# Patient Record
Sex: FEMALE | Race: WHITE
[De-identification: names, ages, dates, MRNs, and addresses within clinical notes are randomized per-mention and may not be internally consistent; named-entity substitution may affect disease eponyms.]

---

## 2018-04-06 ENCOUNTER — HOSPITAL ENCOUNTER (OUTPATIENT)
Dept: HOSPITAL 53 - M ADMPAT | Age: 73
End: 2018-04-06
Attending: ORTHOPAEDIC SURGERY
Payer: MEDICARE

## 2018-04-06 DIAGNOSIS — Z79.899: ICD-10-CM

## 2018-04-06 DIAGNOSIS — Z79.01: ICD-10-CM

## 2018-04-06 DIAGNOSIS — Z01.818: Primary | ICD-10-CM

## 2018-04-06 DIAGNOSIS — M17.12: ICD-10-CM

## 2018-04-06 LAB
ALBUMIN/GLOBULIN RATIO: 1.17 (ref 1–1.93)
ALBUMIN: 4.1 GM/DL (ref 3.2–5.2)
ALKALINE PHOSPHATASE: 79 U/L (ref 45–117)
ALT SERPL W P-5'-P-CCNC: 26 U/L (ref 12–78)
ANION GAP: 3 MEQ/L (ref 8–16)
APPEARANCE, URINE: CLEAR
AST SERPL-CCNC: 20 U/L (ref 7–37)
BACTERIA UR QL AUTO: NEGATIVE
BILIRUBIN, URINE AUTO: NEGATIVE
BILIRUBIN,TOTAL: 0.4 MG/DL (ref 0.2–1)
BLOOD UREA NITROGEN: 12 MG/DL (ref 7–18)
BLOOD, URINE BLOOD: NEGATIVE
CALCIUM LEVEL: 9.2 MG/DL (ref 8.8–10.2)
CARBON DIOXIDE LEVEL: 33 MEQ/L (ref 21–32)
CHLORIDE LEVEL: 108 MEQ/L (ref 98–107)
CREATININE FOR GFR: 0.87 MG/DL (ref 0.55–1.3)
ERYTHROCYTE SEDIMENTATION RATE: 10 MM/HR (ref 0–30)
GFR SERPL CREATININE-BSD FRML MDRD: > 60 ML/MIN/{1.73_M2} (ref 39–?)
GLUCOSE, FASTING: 90 MG/DL (ref 70–100)
GLUCOSE, URINE (UA) AUTO: NEGATIVE MG/DL
HEMATOCRIT: 42.1 % (ref 36–47)
HEMOGLOBIN: 14 G/DL (ref 12–15.5)
INR: 0.89
KETONE, URINE AUTO: NEGATIVE MG/DL
LEUKOCYTE ESTERASE UR QL STRIP.AUTO: (no result)
MEAN CORPUSCULAR HEMOGLOBIN: 29.8 PG (ref 27–33)
MEAN CORPUSCULAR HGB CONC: 33.3 G/DL (ref 32–36.5)
MEAN CORPUSCULAR VOLUME: 89.6 FL (ref 80–96)
MUCUS, URINE: (no result)
NITRITE, URINE AUTO: NEGATIVE
NRBC BLD AUTO-RTO: 0 % (ref 0–0)
PH,URINE: 7 UNITS (ref 5–9)
PLATELET COUNT, AUTOMATED: 200 10^3/UL (ref 150–450)
POTASSIUM SERUM: 4.3 MEQ/L (ref 3.5–5.1)
PROT UR QL STRIP.AUTO: NEGATIVE MG/DL
PROTHROMBIN TIME: 12.1 SECONDS (ref 12.4–14.5)
RBC, URINE AUTO: 3 /HPF (ref 0–3)
RED BLOOD COUNT: 4.7 10^6/UL (ref 4–5.4)
RED CELL DISTRIBUTION WIDTH: 14 % (ref 11.5–14.5)
SODIUM LEVEL: 144 MEQ/L (ref 136–145)
SPECIFIC GRAVITY URINE AUTO: 1 (ref 1–1.03)
SQUAMOUS #/AREA URNS AUTO: 0 /HPF (ref 0–6)
TOTAL PROTEIN: 7.6 GM/DL (ref 6.4–8.2)
UROBILINOGEN, URINE AUTO: 0.2 MG/DL (ref 0–2)
WBC, URINE AUTO: 9 /HPF (ref 0–3)
WHITE BLOOD COUNT: 5.4 10^3/UL (ref 4–10)

## 2018-04-06 PROCEDURE — 71046 X-RAY EXAM CHEST 2 VIEWS: CPT

## 2018-04-27 ENCOUNTER — HOSPITAL ENCOUNTER (INPATIENT)
Dept: HOSPITAL 53 - M OR | Age: 73
LOS: 3 days | Discharge: HOME HEALTH | DRG: 470 | End: 2018-04-30
Attending: ORTHOPAEDIC SURGERY | Admitting: ORTHOPAEDIC SURGERY
Payer: MEDICARE

## 2018-04-27 DIAGNOSIS — Z86.73: ICD-10-CM

## 2018-04-27 DIAGNOSIS — Z88.6: ICD-10-CM

## 2018-04-27 DIAGNOSIS — E78.5: ICD-10-CM

## 2018-04-27 DIAGNOSIS — M17.12: Primary | ICD-10-CM

## 2018-04-27 DIAGNOSIS — J32.9: ICD-10-CM

## 2018-04-27 DIAGNOSIS — Z79.899: ICD-10-CM

## 2018-04-27 DIAGNOSIS — K21.9: ICD-10-CM

## 2018-04-27 DIAGNOSIS — J31.0: ICD-10-CM

## 2018-04-27 DIAGNOSIS — E03.9: ICD-10-CM

## 2018-04-27 DIAGNOSIS — I48.0: ICD-10-CM

## 2018-04-27 DIAGNOSIS — E55.9: ICD-10-CM

## 2018-04-27 DIAGNOSIS — Z79.82: ICD-10-CM

## 2018-04-27 PROCEDURE — 0SRD0J9 REPLACEMENT OF LEFT KNEE JOINT WITH SYNTHETIC SUBSTITUTE, CEMENTED, OPEN APPROACH: ICD-10-PCS

## 2018-04-27 RX ADMIN — Medication 1 CAP: at 14:33

## 2018-04-27 RX ADMIN — ROSUVASTATIN CALCIUM 1 MG: 10 TABLET, FILM COATED ORAL at 14:35

## 2018-04-27 RX ADMIN — Medication 1 UNITS: at 14:33

## 2018-04-27 RX ADMIN — DEXTROSE MONOHYDRATE 1 MG: 50 INJECTION, SOLUTION INTRAVENOUS at 08:52

## 2018-04-27 RX ADMIN — ROSUVASTATIN CALCIUM 1 MG: 10 TABLET, FILM COATED ORAL at 14:33

## 2018-04-27 RX ADMIN — CEFAZOLIN SODIUM 1 GM: 1 INJECTION, POWDER, FOR SOLUTION INTRAMUSCULAR; INTRAVENOUS at 08:53

## 2018-04-27 RX ADMIN — FEXOFENADINE HYDROCHLORIDE 1 MG: 60 TABLET ORAL at 14:33

## 2018-04-27 RX ADMIN — MIDAZOLAM 1 MG: 1 INJECTION INTRAMUSCULAR; INTRAVENOUS at 07:15

## 2018-04-27 RX ADMIN — WARFARIN SODIUM 1 MG: 5 TABLET ORAL at 16:13

## 2018-04-27 RX ADMIN — SODIUM CHLORIDE 1 MLS/HR: 9 INJECTION, SOLUTION INTRAVENOUS at 23:45

## 2018-04-27 RX ADMIN — SODIUM CHLORIDE, POTASSIUM CHLORIDE, SODIUM LACTATE AND CALCIUM CHLORIDE 1 MLS/HR: 600; 310; 30; 20 INJECTION, SOLUTION INTRAVENOUS at 11:23

## 2018-04-27 RX ADMIN — SODIUM CHLORIDE, POTASSIUM CHLORIDE, SODIUM LACTATE AND CALCIUM CHLORIDE 1 MLS/HR: 600; 310; 30; 20 INJECTION, SOLUTION INTRAVENOUS at 14:33

## 2018-04-27 RX ADMIN — SODIUM CHLORIDE, POTASSIUM CHLORIDE, SODIUM LACTATE AND CALCIUM CHLORIDE 1 MLS/HR: 600; 310; 30; 20 INJECTION, SOLUTION INTRAVENOUS at 06:17

## 2018-04-27 RX ADMIN — FENTANYL CITRATE 1 MCG: 50 INJECTION, SOLUTION INTRAMUSCULAR; INTRAVENOUS at 07:15

## 2018-04-27 RX ADMIN — DIGOXIN 1 MG: 0.25 INJECTION INTRAMUSCULAR; INTRAVENOUS at 23:12

## 2018-04-27 RX ADMIN — LEVOTHYROXINE SODIUM 1 MCG: 75 TABLET ORAL at 14:33

## 2018-04-27 RX ADMIN — ACETAMINOPHEN 1 MG: 325 TABLET ORAL at 22:13

## 2018-04-27 RX ADMIN — ONDANSETRON 1 MG: 2 INJECTION INTRAMUSCULAR; INTRAVENOUS at 20:43

## 2018-04-27 RX ADMIN — MAGNESIUM HYDROXIDE 1 ML: 400 SUSPENSION ORAL at 17:58

## 2018-04-27 RX ADMIN — ASPIRIN 1 MG: 81 TABLET ORAL at 14:33

## 2018-04-27 RX ADMIN — FLUTICASONE PROPIONATE 1 SPRAY: 50 SPRAY, METERED NASAL at 14:33

## 2018-04-27 RX ADMIN — BUPIVACAINE 1 ML: 13.3 INJECTION, SUSPENSION, LIPOSOMAL INFILTRATION at 08:53

## 2018-04-28 LAB
ANION GAP: 4 MEQ/L (ref 8–16)
BLOOD UREA NITROGEN: 16 MG/DL (ref 7–18)
CALCIUM LEVEL: 7.9 MG/DL (ref 8.8–10.2)
CARBON DIOXIDE LEVEL: 30 MEQ/L (ref 21–32)
CHLORIDE LEVEL: 109 MEQ/L (ref 98–107)
CREATININE FOR GFR: 0.99 MG/DL (ref 0.55–1.3)
FT4I SERPL CALC-MCNC: 4.3 % (ref 1.3–4.8)
GFR SERPL CREATININE-BSD FRML MDRD: 58.9 ML/MIN/{1.73_M2} (ref 39–?)
GLUCOSE, FASTING: 102 MG/DL (ref 70–100)
HEMATOCRIT: 33.8 % (ref 36–47)
HEMOGLOBIN: 11.1 G/DL (ref 12–15.5)
INR: 1.03
MAGNESIUM LEVEL: 2.5 MG/DL (ref 1.8–2.4)
MEAN CORPUSCULAR HEMOGLOBIN: 29.8 PG (ref 27–33)
MEAN CORPUSCULAR HGB CONC: 32.8 G/DL (ref 32–36.5)
MEAN CORPUSCULAR VOLUME: 90.6 FL (ref 80–96)
NRBC BLD AUTO-RTO: 0 % (ref 0–0)
PLATELET COUNT, AUTOMATED: 148 10^3/UL (ref 150–450)
POTASSIUM SERUM: 3.9 MEQ/L (ref 3.5–5.1)
PROTHROMBIN TIME: 13.6 SECONDS (ref 12.4–14.5)
RED BLOOD COUNT: 3.73 10^6/UL (ref 4–5.4)
RED CELL DISTRIBUTION WIDTH: 14.4 % (ref 11.5–14.5)
SODIUM LEVEL: 143 MEQ/L (ref 136–145)
T UPTAKE: 38 % (ref 30–39)
THYROID STIMULATING HORMONE: 0.99 UIU/ML (ref 0.36–3.74)
THYROXINE (T4): 11.2 UG/DL (ref 4.5–12)
WHITE BLOOD COUNT: 12.1 10^3/UL (ref 4–10)

## 2018-04-28 RX ADMIN — FLUTICASONE PROPIONATE 1 SPRAY: 50 SPRAY, METERED NASAL at 08:28

## 2018-04-28 RX ADMIN — SODIUM CHLORIDE, POTASSIUM CHLORIDE, SODIUM LACTATE AND CALCIUM CHLORIDE 1 MLS/HR: 600; 310; 30; 20 INJECTION, SOLUTION INTRAVENOUS at 00:29

## 2018-04-28 RX ADMIN — DIGOXIN 1 MG: 0.25 INJECTION INTRAMUSCULAR; INTRAVENOUS at 00:28

## 2018-04-28 RX ADMIN — ACETAMINOPHEN 1 MG: 325 TABLET ORAL at 08:30

## 2018-04-28 RX ADMIN — SODIUM CHLORIDE 1 MLS/HR: 9 INJECTION, SOLUTION INTRAVENOUS at 00:30

## 2018-04-28 RX ADMIN — METOPROLOL TARTRATE 1 MG: 25 TABLET, FILM COATED ORAL at 11:19

## 2018-04-28 RX ADMIN — LEVOTHYROXINE SODIUM 1 MCG: 75 TABLET ORAL at 06:25

## 2018-04-28 RX ADMIN — HYDROCODONE BITARTRATE AND ACETAMINOPHEN 1 TAB: 5; 325 TABLET ORAL at 01:06

## 2018-04-28 RX ADMIN — MORPHINE SULFATE 1 MG: 4 INJECTION INTRAVENOUS at 16:29

## 2018-04-28 RX ADMIN — WARFARIN SODIUM 1 MG: 5 TABLET ORAL at 16:29

## 2018-04-28 RX ADMIN — HYDROCODONE BITARTRATE AND ACETAMINOPHEN 1 TAB: 5; 325 TABLET ORAL at 11:46

## 2018-04-28 RX ADMIN — Medication 1 CAP: at 08:28

## 2018-04-28 RX ADMIN — Medication 1 UNITS: at 08:28

## 2018-04-28 RX ADMIN — METOPROLOL TARTRATE 1 MG: 25 TABLET, FILM COATED ORAL at 20:48

## 2018-04-28 RX ADMIN — HYDROCODONE BITARTRATE AND ACETAMINOPHEN 1 TAB: 5; 325 TABLET ORAL at 23:51

## 2018-04-28 RX ADMIN — FEXOFENADINE HYDROCHLORIDE 1 MG: 60 TABLET ORAL at 08:29

## 2018-04-28 RX ADMIN — HYDROCODONE BITARTRATE AND ACETAMINOPHEN 1 TAB: 5; 325 TABLET ORAL at 17:45

## 2018-04-28 RX ADMIN — ASPIRIN 1 MG: 81 TABLET ORAL at 08:29

## 2018-04-28 RX ADMIN — MORPHINE SULFATE 1 MG: 4 INJECTION INTRAVENOUS at 14:46

## 2018-04-29 LAB
HEMATOCRIT: 34.2 % (ref 36–47)
HEMOGLOBIN: 11.4 G/DL (ref 12–15.5)
INR: 1.44
MEAN CORPUSCULAR HEMOGLOBIN: 30.3 PG (ref 27–33)
MEAN CORPUSCULAR HGB CONC: 33.3 G/DL (ref 32–36.5)
MEAN CORPUSCULAR VOLUME: 91 FL (ref 80–96)
NRBC BLD AUTO-RTO: 0 % (ref 0–0)
PLATELET COUNT, AUTOMATED: 145 10^3/UL (ref 150–450)
PROTHROMBIN TIME: 17.9 SECONDS (ref 12.4–14.5)
RED BLOOD COUNT: 3.76 10^6/UL (ref 4–5.4)
RED CELL DISTRIBUTION WIDTH: 14.6 % (ref 11.5–14.5)
WHITE BLOOD COUNT: 10.1 10^3/UL (ref 4–10)

## 2018-04-29 RX ADMIN — METOPROLOL TARTRATE 1 MG: 25 TABLET, FILM COATED ORAL at 21:53

## 2018-04-29 RX ADMIN — Medication 1 UNITS: at 08:59

## 2018-04-29 RX ADMIN — LEVOTHYROXINE SODIUM 1 MCG: 75 TABLET ORAL at 06:03

## 2018-04-29 RX ADMIN — FLUTICASONE PROPIONATE 1 SPRAY: 50 SPRAY, METERED NASAL at 09:00

## 2018-04-29 RX ADMIN — FEXOFENADINE HYDROCHLORIDE 1 MG: 60 TABLET ORAL at 21:52

## 2018-04-29 RX ADMIN — Medication 1 CAP: at 08:58

## 2018-04-29 RX ADMIN — HYDROCODONE BITARTRATE AND ACETAMINOPHEN 1 TAB: 5; 325 TABLET ORAL at 18:29

## 2018-04-29 RX ADMIN — ROSUVASTATIN CALCIUM 1 MG: 10 TABLET, FILM COATED ORAL at 08:59

## 2018-04-29 RX ADMIN — WARFARIN SODIUM 1 MG: 7.5 TABLET ORAL at 18:30

## 2018-04-29 RX ADMIN — ASPIRIN 1 MG: 81 TABLET ORAL at 08:58

## 2018-04-29 RX ADMIN — HYDROCODONE BITARTRATE AND ACETAMINOPHEN 1 TAB: 5; 325 TABLET ORAL at 12:38

## 2018-04-29 RX ADMIN — FEXOFENADINE HYDROCHLORIDE 1 MG: 60 TABLET ORAL at 08:58

## 2018-04-29 RX ADMIN — HYDROCODONE BITARTRATE AND ACETAMINOPHEN 1 TAB: 5; 325 TABLET ORAL at 06:03

## 2018-04-29 RX ADMIN — METOPROLOL TARTRATE 1 MG: 25 TABLET, FILM COATED ORAL at 08:59

## 2018-04-29 RX ADMIN — ROSUVASTATIN CALCIUM 1 MG: 10 TABLET, FILM COATED ORAL at 21:52

## 2018-04-30 LAB
INR: 1.52
PROTHROMBIN TIME: 18.7 SECONDS (ref 12.4–14.5)

## 2018-04-30 RX ADMIN — Medication 1 UNITS: at 08:36

## 2018-04-30 RX ADMIN — HYDROCODONE BITARTRATE AND ACETAMINOPHEN 1 TAB: 5; 325 TABLET ORAL at 01:55

## 2018-04-30 RX ADMIN — DOCUSATE SODIUM 1 MG: 100 CAPSULE, LIQUID FILLED ORAL at 08:43

## 2018-04-30 RX ADMIN — LEVOTHYROXINE SODIUM 1 MCG: 75 TABLET ORAL at 05:32

## 2018-04-30 RX ADMIN — Medication 1 CAP: at 08:36

## 2018-04-30 RX ADMIN — FLUTICASONE PROPIONATE 1 SPRAY: 50 SPRAY, METERED NASAL at 08:37

## 2018-04-30 RX ADMIN — METOPROLOL TARTRATE 1 MG: 25 TABLET, FILM COATED ORAL at 08:36

## 2018-04-30 RX ADMIN — HYDROCODONE BITARTRATE AND ACETAMINOPHEN 1 TAB: 5; 325 TABLET ORAL at 08:37

## 2020-06-12 ENCOUNTER — HOSPITAL ENCOUNTER (OUTPATIENT)
Dept: HOSPITAL 53 - M LABSMTC | Age: 75
End: 2020-06-12
Attending: ANESTHESIOLOGY
Payer: MEDICARE

## 2020-06-12 DIAGNOSIS — Z01.818: Primary | ICD-10-CM

## 2020-06-12 DIAGNOSIS — Z11.59: ICD-10-CM

## 2020-06-12 NOTE — HPE
DATE OF ANTICIPATED ADMISSION:  06/15/2020

 

ATTENDING PHYSICIAN:  Dr. Genaro Kellogg

 

CHIEF COMPLAINT:  Right knee pain and stiffness.

 

HISTORY:

The patient is a pleasant 74-year-old female with progressively worsening right

knee pain and stiffness.  She failed to improve with conservative measures.  She

continues to have symptoms with weightbearing activities and activities of daily

living.  She consented for an elective right total knee arthroplasty with Dr. Kellogg for her continued symptoms.  Medical optimization completed with MARILYNN Bernardo.

 

CURRENT MEDICATIONS:

- B complex vitamins

- Colace 100 mg daily

- Allegra 180 mg daily

- levothyroxine 75 mcg daily

- loratadine 10 mg daily

- daily multivitamin

- vitamin D 1000 units daily

- Xarelto 10 mg daily

 

ALLERGIES:

ANTIINFLAMMATORIES, results in gastrointestinal (GI) upset.

 

CHRONIC MEDICAL CONDITIONS:

Chronic recurrent sinusitis.

Gastroesophageal reflux disease.

History of transient ischemic attack (TIA).

Hyperlipidemia.

Hypothyroid.

Paroxysmal atrial fibrillation.

Chronic rhinitis.

 

PAST SURGICAL HISTORY:

Left total knee arthroplasty.

Cholecystectomy.

Bunionectomy.

Partial hysterectomy.

Bladder repair.

Hammertoe correction.

Tubal ligation.

 

SOCIAL HISTORY:

The patient does not use tobacco and rarely consumes alcohol.

 

REVIEW OF SYSTEMS:

The patient denies fevers, chills, nausea, vomiting, or diarrhea.  Denies chest

pain, shortness of breath, lightheadedness, dizziness, or headaches.  She denies

any abdominal pain or headaches.  She denies any recent upper respiratory or

urinary tract infection symptoms.  She continues to have right knee pain with

weightbearing activities and activities of daily living.

 

PHYSICAL EXAMINATION:

GENERAL:  Well-nourished, well-developed female in no apparent distress.  She is

alert, oriented and cooperative.  Mood and affect are appropriate.

VITAL SIGNS:  Blood pressure 128/64, heart rate 68, respirations 13.  Height 66

inches.  Weight 177 pounds.  Temperature 96.8.

HEART:  Regular rate and rhythm despite diagnosis of paroxysmal atrial

fibrillation.

LUNGS:  Clear to auscultation bilaterally.  Breathing is regular and nonlabored.

 

ABDOMEN:  Soft and nontender to palpation.  Bowel sounds are present.

MUSCULOSKELETAL:  The patient is walking with a slight limp favoring the right

lower extremity.  Right knee exhibits no gross abnormalities.  Skin is intact.

She has full extension of the knee and can flex to approximately 110 degrees.

Right lower extremity strength is 5/5.  No hip irritability elicited with range

of motion testing.  The patient's calf is soft, nontender to palpation with no

palpable cords noted.  She is neurovascularly intact distally.

 

LABORATORY DATA:

EKG normal sinus with rhythm with possible lateral infarct age indeterminate, no

change from previous studies.

 

Chest x-ray no acute cardiopulmonary process.

 

Right knee x-ray notable for end-stage degenerative changes.

 

Complete blood count - WBCs 5, RBCs 4.65, hemoglobin 14.1, hematocrit 42.2,

platelets 184.

 

Prothrombin time 10.6, INR 1.  ESR 12.

 

Comprehensive metabolic profile - BUN 17, sodium 141, potassium 4.0, chloride

108, carbon dioxide 20, anion gap 9, glucose 95, creatinine 0.9, ALT 20, AST 22,

alkaline phosphatase 71, calcium 9.4, bilirubin 0.4, albumin 4.2, protein 7.9,

GFR is greater than 60.

 

IMPRESSION:

Right knee osteoarthritis with x-rays notable for end-stage degenerative

changes.

 

PLAN:

The patient consented for an elective right total knee arthroplasty with Dr. Kellogg

for her continued symptoms.  Medical optimization completed with MARILYNN Bernardo.  The patient is using her Hibiclens wash and mupirocin nasal ointment.

She will follow her primary care manager's recommendations for stopping

anticoagulants and how to take their normal daily medications.  The patient will

be nothing by mouth after midnight with the exception of a small sip of water to

take her metoprolol per her primary care manager's recommendations.

## 2020-06-15 ENCOUNTER — HOSPITAL ENCOUNTER (INPATIENT)
Dept: HOSPITAL 53 - M OR | Age: 75
LOS: 1 days | Discharge: HOME HEALTH | DRG: 470 | End: 2020-06-16
Attending: ORTHOPAEDIC SURGERY | Admitting: ORTHOPAEDIC SURGERY
Payer: MEDICARE

## 2020-06-15 VITALS — DIASTOLIC BLOOD PRESSURE: 73 MMHG | SYSTOLIC BLOOD PRESSURE: 135 MMHG

## 2020-06-15 VITALS — SYSTOLIC BLOOD PRESSURE: 103 MMHG | DIASTOLIC BLOOD PRESSURE: 66 MMHG

## 2020-06-15 VITALS — SYSTOLIC BLOOD PRESSURE: 147 MMHG | DIASTOLIC BLOOD PRESSURE: 79 MMHG

## 2020-06-15 VITALS — DIASTOLIC BLOOD PRESSURE: 90 MMHG | SYSTOLIC BLOOD PRESSURE: 134 MMHG

## 2020-06-15 VITALS — WEIGHT: 178.8 LBS | HEIGHT: 65 IN | BODY MASS INDEX: 29.79 KG/M2

## 2020-06-15 VITALS — SYSTOLIC BLOOD PRESSURE: 128 MMHG | DIASTOLIC BLOOD PRESSURE: 73 MMHG

## 2020-06-15 DIAGNOSIS — Z79.899: ICD-10-CM

## 2020-06-15 DIAGNOSIS — Z86.73: ICD-10-CM

## 2020-06-15 DIAGNOSIS — K21.9: ICD-10-CM

## 2020-06-15 DIAGNOSIS — Z88.6: ICD-10-CM

## 2020-06-15 DIAGNOSIS — Z88.1: ICD-10-CM

## 2020-06-15 DIAGNOSIS — Z87.891: ICD-10-CM

## 2020-06-15 DIAGNOSIS — I48.0: ICD-10-CM

## 2020-06-15 DIAGNOSIS — E78.5: ICD-10-CM

## 2020-06-15 DIAGNOSIS — J30.2: ICD-10-CM

## 2020-06-15 DIAGNOSIS — Z88.8: ICD-10-CM

## 2020-06-15 DIAGNOSIS — Z90.49: ICD-10-CM

## 2020-06-15 DIAGNOSIS — Z11.59: ICD-10-CM

## 2020-06-15 DIAGNOSIS — M17.11: Primary | ICD-10-CM

## 2020-06-15 DIAGNOSIS — Z79.01: ICD-10-CM

## 2020-06-15 DIAGNOSIS — E03.9: ICD-10-CM

## 2020-06-15 DIAGNOSIS — Z96.652: ICD-10-CM

## 2020-06-15 PROCEDURE — 0SRC0J9 REPLACEMENT OF RIGHT KNEE JOINT WITH SYNTHETIC SUBSTITUTE, CEMENTED, OPEN APPROACH: ICD-10-PCS | Performed by: ORTHOPAEDIC SURGERY

## 2020-06-15 RX ADMIN — CEFAZOLIN SODIUM SCH MLS/HR: 2 SOLUTION INTRAVENOUS at 20:53

## 2020-06-15 RX ADMIN — MORPHINE SULFATE PRN MG: 2 INJECTION, SOLUTION INTRAMUSCULAR; INTRAVENOUS at 19:33

## 2020-06-15 NOTE — HPEPDOC
[facility] Medical History & Physical


Date of Admission


Nathaniel 15, 2020


Date of Service:  Nathaniel 15, 2020


Primary Care Physician:  Genaro Mosley


Attending Physician:  NAEEM MOURA MD





History and Physical


TIME OF SERVICE: 2:20 PM





REASON FOR CONSULT: Postoperative care





HISTORY OF PRESENT ILLNESS: 


This is a 74-year-old female who underwent elective right total knee replacement

to manage severe right knee osteoarthritis. Currently, she reports her pain is 

well-controlled and has no acute complaints





REVIEW OF SYSTEMS: n/a





PAST MEDICAL/ SURGICAL HISTORY:


TIA 


Dyslipidemia


Hypothyroidism


GERD


Chronic rhinitis


Paroxysmal atrial fibrillation on Xarelto


Left knee replacement 2 years ago


Partial hysterectomy


Bladder surgery


Cholecystectomy


Tubal ligation


Bunion surgeries


She denies having a history of diabetes or CAD





SOCIAL HISTORY:


She is a former smoker


She rarely drinks alcohol





FAMILY HISTORY:


CAD


Dyslipidemia


Chronic hypertension


Lung cancer


Breast cancer


Diabetes


   


ALLERGIES: Please see below





HOME MEDICATIONS: Please see below





PHYSICAL EXAMINATION:


Vital Signs








  Date Time  Temp Pulse Resp B/P (MAP) Pulse Ox O2 Delivery O2 Flow Rate FiO2


 


6/15/20 10:00 97.8 72 18 174/97 (122) 96 Room Air  


 


6/15/20 11:05       2 





GEN: well-nourished / well developed/ NAD


INTEGUMENT: not flushed/ not jaundice / she has an old well-healed scar at the 

left knee 


HEENT: NCAT / mucus membranes moist and pink 


CVS: RRR/NMRG/ radial pulses intact / no lower extremity edema


LUNGS: no coughing / lungs are clear to auscultation bilaterally on room air


MSK/EXTREMITIES: Right lower extremity wrapped in cast 


NEURO: CN 2-12 are grossly intact / speech is not dysarthric 


PSYCH: alert and oriented / able to understand and follow all commands





IMAGING: 


Postoperative X-ray of the right knee...report pending





ASSESSMENT: 


Ms. Shelton is a 74-year-old with a history of TIA, dyslipidemia, hypothyroidism,

GERD, paroxysmal atrial fibrillation, left knee replacement, and multiple other 

surgeries, who underwent right total knee replacement to manage severe arthriti

s; we were consulted for postoperative care.





PLAN:


1. Severe right knee OA, status post right total knee replacement  - Pain ma

nagement per primary team / PT/OT 


2. Dyslipidemia- rosuvastatin


3. Hypothyroidism - levothyroxine


4. Paroxysmal atrial fibrillation on Xarelto - metoprolol and rivaroxaban


DVT PROPHYLAXIS: n/a pt is on AC


DISPOSITION: pending clinical course





Home Medications


Scheduled


Cholecalciferol (Vitamin D3) (Vitamin D3) 125 Mcg Capsule, 250 MCG PO DAILY


Levothyroxine Sodium (Levothyroxine Sodium) 75 Mcg Tab, 75 MCG PO DAILY


Loratadine (Loratadine) 10 Mg Tablet, 10 MG PO DAILY


Metoprolol Succinate (Metoprolol Succinate) 25 Mg Tab.er.24h, 25 MG PO DAILY


Rivaroxaban (Xarelto) 20 Mg Tablet, 20 MG PO DAILY


Rosuvastatin Calcium (Crestor) 10 Mg Tab, 10 MG PO QHS


   TAKES AT HS 





Miscellaneous Medications


Fluticasone Propionate (Fluticasone Propionate) 0.05 % Lot, 0.05 % EX


   2sprays each nostril 





Allergies


Coded Allergies:  


     meloxicam (Verified  Allergy, Severe, difficulty swallowing, 6/9/20)


     clavulanic acid (Verified  Allergy, Intermediate, RASH, 6/15/20)


     doxycycline (Verified  Allergy, Intermediate, rash, 6/9/20)


     SEASONAL ALLERGIES (Verified  Allergy, Unknown, 4/6/18)











NAEEM MOURA MD                Nathaniel 15, 2020 14:41

## 2020-06-15 NOTE — REP
RIGHT KNEE SERIES:  Two views.

 

HISTORY:  Postoperative evaluation.

 

FINDINGS:  AP and lateral views of the right knee demonstrate that the patient is

status post right knee arthroplasty.  Femoral, tibial, and radiolucent patellar

prosthetic components appear well aligned.  There is interarticular and

periarticular soft tissue emphysema.  Anterior skin staples are seen.  Vascular

calcification is noted.

 

IMPRESSION:

 

Status post right knee arthroplasty.

 

 

Electronically Signed by

Jonathan Conner MD 06/15/2020 06:15 P

## 2020-06-15 NOTE — IPN
DATE:  06/15/2020

 

Patient seen and examined.  She wishes to go ahead with a right total knee

arthroplasty.  She understands the nature of this, the risks of bleeding,

infection, damage to nerves, vessels, persistent pain, wear loosening, blood

clots, fracture, among others including death.  Plan on proceeding with a right

knee arthroplasty.

## 2020-06-16 VITALS — SYSTOLIC BLOOD PRESSURE: 105 MMHG | DIASTOLIC BLOOD PRESSURE: 62 MMHG

## 2020-06-16 VITALS — DIASTOLIC BLOOD PRESSURE: 64 MMHG | SYSTOLIC BLOOD PRESSURE: 97 MMHG

## 2020-06-16 VITALS — DIASTOLIC BLOOD PRESSURE: 68 MMHG | SYSTOLIC BLOOD PRESSURE: 105 MMHG

## 2020-06-16 VITALS — DIASTOLIC BLOOD PRESSURE: 63 MMHG | SYSTOLIC BLOOD PRESSURE: 106 MMHG

## 2020-06-16 LAB
HCT VFR BLD AUTO: 35.8 % (ref 36–47)
HGB BLD-MCNC: 11.7 G/DL (ref 12–15.5)
MCH RBC QN AUTO: 30.1 PG (ref 27–33)
MCHC RBC AUTO-ENTMCNC: 32.7 G/DL (ref 32–36.5)
MCV RBC AUTO: 92 FL (ref 80–96)
PLATELET # BLD AUTO: 149 10^3/UL (ref 150–450)
RBC # BLD AUTO: 3.89 10^6/UL (ref 4–5.4)
WBC # BLD AUTO: 7.5 10^3/UL (ref 4–10)

## 2020-06-16 RX ADMIN — CEFAZOLIN SODIUM SCH MLS/HR: 2 SOLUTION INTRAVENOUS at 05:36

## 2020-06-16 RX ADMIN — MORPHINE SULFATE PRN MG: 2 INJECTION, SOLUTION INTRAMUSCULAR; INTRAVENOUS at 02:12

## 2020-06-17 NOTE — RO
DATE OF PROCEDURE:  06/15/2020

 

PREOPERATIVE DIAGNOSIS:  Right knee osteoarthritis.

 

POSTOPERATIVE DIAGNOSIS:  Right knee osteoarthritis.

 

PROCEDURE:  Right total knee arthroplasty using an Attune rotating platform

posterior stabilized size 4 femur, size 4 tibia, 10 polyethylene and 35 patellar

button.

 

SURGEON:  Dr. Genaro Kellogg

 

FIRST ASSISTANT:  MARILYNN Werner

 

ANESTHESIA:  Spinal

 

ESTIMATED BLOOD LOSS:  Less than 50.

 

COMPLICATIONS:  None.

 

INDICATIONS:  This 74-year woman has had gradually worsening right knee pain.

X-ray showed severe arthritis.  She wished to go ahead with knee replacement.

She understood the nature and risks associated with this.

 

DESCRIPTION OF PROCEDURE:  The patient was taken to the operating room and placed

in supine position after spinal anesthesia was induced.  The right lower

extremity was prepped and draped in the usual sterile fashion.  A time out was

performed and tourniquet was inflated.  We had prepped and draped in the usual

fashion.  I then created a longitudinal incision over the anterior aspect of the

knee.  Sharp dissection was carried down through subcutaneous tissue.  There were

some subcutaneous bleeders that were controlled with cautery.  I performed a

medial parapatellar arthrotomy per routine.  I did a medial release.  I removed

some of the fat pad for visualization.  I everted the patella and flexed the knee

up.  Used the canal initiating reamer on the femoral side, followed by the

intramedullary guide set at 5 degrees of valgus, and a 9 mm cut.  The pins were

placed.  Distal femoral cut was made, protecting soft tissues.  I then sized the

femur to be a 4.  Placed the drill holes in the end of the femur with the

external rotation dialed in and then secured the 4 cutting block and made the

remaining four cuts in the usual fashion.  I then prepared the tibial surface.

The posterior retractor was placed and the external alignment guide was used and

placed in appropriate amount of valgus and posterior slope.  This was pinned in

place at 4 mm off the medial side, which was the low side.  The proximal tibia

cut was made and I protected soft tissues at all times.  It was evident that the

posterior cruciate ligament (PCL) was deficient at this point.  I did remove soft

tissue and osteophytes from either side of the knee.  I elected to go ahead with

a posterior stabilized knee due to the deficiency of the PCL, so the box cutting

guide was then placed.  Protected the tibia, made the remaining three cuts and

removed the box from the femoral side.  I then sized the tibia to be a 4, this

was pinned in place, drilled and broached.  Spacer blocks were also used at this

point to determine the estimated thickness of the polyethylene and we felt that a

10 was appropriate in flexion and extension.  Excellent alignment and soft tissue

balance was noted.  The trial components were placed.  I had drilled and broached

the tibia, placed the femoral component in place, which seated nicely.  The

tibial tray was sitting nicely.  I placed the polyethylene size 10 x 4.  Brought

the knee out in extension, put the knee through a range of motion.  Excellent

position, alignment and stability were noted.  I then freehand cut the patella

removing about 7 mm of bone, sized to be a 35.  The drill holes were placed and

the patellar button tracked very nicely.  I also drilled the holes in the end of

the femur.  The assistant prepared the bone cement in the modern technique.  I

then placed the Exparel in the deep tissues.  Irrigated and dried the bony

surfaces carefully.  Cemented on the ____________________.  Placed the

polyethylene and brought the knee out in some extension.  Cemented on the

patella, held it in place with a patellar clamp.  I then placed the tranexamic

acid (TXA) in the deep wound, irrigated copiously, as I had multiple times up to

this point.  Closed the deep layer with #1 Vicryl suture and a running Stratafix

suture.  Subcutaneous tissue was closed with #2-0 Vicryl and the skin with

staples.  Sterile dressing was applied.  Tourniquet was deflated once the cement

had hardened.  The patient was taken to recovery room in stable condition.  There

were no known complications.  The plan will be routine postop.

 

The assistant was instrumental in holding retractors, assisting in exposure,

assisting in mixing the bone cement, and wound closure.

## 2020-06-18 NOTE — DSES
DATE OF ADMISSION:  06/15/2020

DATE OF DISCHARGE:  06/16/2020

 

ADMISSION DIAGNOSIS:

Osteoarthritis, right knee.

 

OTHER DIAGNOSES:

1.  History of a transient ischemic attack.

2.  Elevated lipids.

3.  Hypothyroidism.

4.  Gastric reflux disease.

5.  Chronic rhinitis.

6.  Atrial fibrillation.

 

DISCHARGE DIAGNOSIS:

Osteoarthritis, right knee, status post right total knee arthroplasty.

 

ATTENDING PHYSICIAN:  Dr. Genaro Kellogg

 

OPERATION PERFORMED:  Right total knee arthroplasty on the right side.

 

HISTORY:  This is 74-year-old female patient with progressively worsening right

knee pain and stiffness.  She failed to improve with conservative management.

She was admitted for elective knee replacement on the right side.

 

HOSPITAL COURSE:  On date of admission the patient underwent a right

total knee arthroplasty, which was uneventful.  She did well in the 
postoperative

period, and her hospital course was without complications.  She is up with

physical therapy per the protocol.  Pain was controlled on day of discharge.  
She

was weightbearing as tolerated on her right lower extremity.  She will use

thromboembolic deterrent (JULISSA) stockings for 30 days postoperatively for deep

vein thrombosis (DVT) prophylaxis.  She will also use Xarelto 10 mg per the

protocol for DVT prophylaxis as well.  She will resume her preoperative

medications and diet.  She was given instructions to include, but not limited 
to,

wound monitoring and activity limitations.  She will followup in our office in

7-10 days for surgical followup.  Please refer to the medical record for further

details.

JULIA